# Patient Record
Sex: MALE | Race: WHITE | NOT HISPANIC OR LATINO | Employment: OTHER | URBAN - NONMETROPOLITAN AREA
[De-identification: names, ages, dates, MRNs, and addresses within clinical notes are randomized per-mention and may not be internally consistent; named-entity substitution may affect disease eponyms.]

---

## 2020-10-15 ENCOUNTER — CONSULT (OUTPATIENT)
Dept: CARDIOLOGY | Facility: CLINIC | Age: 75
End: 2020-10-15

## 2020-10-15 VITALS
HEIGHT: 70 IN | WEIGHT: 213 LBS | SYSTOLIC BLOOD PRESSURE: 120 MMHG | HEART RATE: 72 BPM | DIASTOLIC BLOOD PRESSURE: 74 MMHG | BODY MASS INDEX: 30.49 KG/M2 | OXYGEN SATURATION: 97 %

## 2020-10-15 DIAGNOSIS — Z86.79 S/P ASCENDING AORTIC ANEURYSM REPAIR: ICD-10-CM

## 2020-10-15 DIAGNOSIS — R07.89 CHEST PAIN, ATYPICAL: Primary | ICD-10-CM

## 2020-10-15 DIAGNOSIS — I10 ESSENTIAL HYPERTENSION: ICD-10-CM

## 2020-10-15 DIAGNOSIS — Z98.890 S/P ASCENDING AORTIC ANEURYSM REPAIR: ICD-10-CM

## 2020-10-15 DIAGNOSIS — E78.5 HYPERLIPIDEMIA, UNSPECIFIED HYPERLIPIDEMIA TYPE: ICD-10-CM

## 2020-10-15 PROCEDURE — 99204 OFFICE O/P NEW MOD 45 MIN: CPT | Performed by: INTERNAL MEDICINE

## 2020-10-15 PROCEDURE — 93000 ELECTROCARDIOGRAM COMPLETE: CPT | Performed by: INTERNAL MEDICINE

## 2020-10-15 RX ORDER — WARFARIN SODIUM 4 MG/1
TABLET ORAL
COMMUNITY

## 2020-10-15 RX ORDER — ATORVASTATIN CALCIUM 20 MG/1
TABLET, FILM COATED ORAL
COMMUNITY
End: 2020-11-24 | Stop reason: SDUPTHER

## 2020-10-15 RX ORDER — ASPIRIN 81 MG/1
TABLET ORAL
COMMUNITY

## 2020-10-15 RX ORDER — ATENOLOL 25 MG/1
TABLET ORAL
COMMUNITY

## 2020-10-15 RX ORDER — TRIAMCINOLONE ACETONIDE 1 MG/G
CREAM TOPICAL
COMMUNITY

## 2020-10-15 NOTE — PROGRESS NOTES
"     [facility] Cardiology OP Consult Note    Tad Yanes  [id number]  10/15/2020    Referred By: Marina Hsu DO    Chief Complaint: Chest pain    History of Present Illness:   Mr. Tad Yanes is a 75 y.o. male who presents to the Cardiology Clinic for evaluation of chest pain.  The patient has a past medical history significant for an ascending aortic aneurysm, status post repair in [place] in approximately .  He reports having a coronary angiogram at that time, which was reportedly unremarkable.  His medical history also includes a history of prior DVT for which he is maintained on Coumadin, hyperlipidemia, hypertension, and prediabetes.  He presents to the cardiology clinic today for evaluation of chest pain.  The patient reports up to a 1 year history of chest discomfort.  He describes a substernal \"pressure\" which occurs when walking uphill or at higher levels of exertion.  He reports the chest discomfort resolves quickly with rest.  He denies any symptoms while at rest.  No associated diaphoresis, nausea, or vomiting.  No radiation of the pain.  He reports that the pain has not progressed in frequency or intensity over the past several months.  He denies any orthopnea, PND, or lower extremity swelling.  No palpitations.  No other specific complaints at this time.    Past Cardiac Testin. Last Coronary Angio: , reportedly unremarkable  2. Prior Stress Testing: None  3. Last Echo: Unknown  4. Prior Holter Monitor: None    Review of Systems:   Review of Systems   Constitutional: Negative for activity change, appetite change, chills, diaphoresis, fatigue, fever, unexpected weight gain and unexpected weight loss.   Eyes: Negative for blurred vision and double vision.   Respiratory: Positive for chest tightness. Negative for cough, shortness of breath and wheezing.    Cardiovascular: Positive for chest pain. Negative for palpitations and leg swelling.   Gastrointestinal: " Negative for abdominal pain, anal bleeding, blood in stool and GERD.   Endocrine: Negative for cold intolerance and heat intolerance.   Genitourinary: Negative for hematuria.   Neurological: Negative for dizziness, syncope, weakness and light-headedness.   Hematological: Does not bruise/bleed easily.   Psychiatric/Behavioral: Negative for depressed mood and stress. The patient is not nervous/anxious.        Past Medical History:   Past Medical History:   Diagnosis Date   • Heart murmur    • History of blood clots        Past Surgical History:   Past Surgical History:   Procedure Laterality Date   • ASCENDING AORTIC ANEURYSM REPAIR  2010       Family History:   Family History   Problem Relation Age of Onset   • Arthritis Mother    • Obesity Father    • Heart attack Brother    • Obesity Brother    • Diabetes Brother    • Diabetes Paternal Grandmother        Social History:   Social History     Socioeconomic History   • Marital status:      Spouse name: Not on file   • Number of children: Not on file   • Years of education: Not on file   • Highest education level: Not on file   Tobacco Use   • Smoking status: Never Smoker   • Smokeless tobacco: Never Used   Substance and Sexual Activity   • Alcohol use: Never     Frequency: Never   • Drug use: Never   • Sexual activity: Defer       Medications:     Current Outpatient Medications:   •  aspirin (aspirin) 81 MG EC tablet, aspirin 81 mg tablet,delayed release  Take 1 tablet every day by oral route., Disp: , Rfl:   •  atenolol (TENORMIN) 25 MG tablet, atenolol 25 mg tablet  Take 0.5 tablets every day by oral route., Disp: , Rfl:   •  atorvastatin (LIPITOR) 20 MG tablet, atorvastatin 20 mg tablet  TAKE 1 TABLET BY MOUTH ONE TIME A DAY, Disp: , Rfl:   •  Coenzyme Q10 (COQ-10 PO), CoQ-10, Disp: , Rfl:   •  Misc Natural Products (PROSTATE HEALTH PO), Prostate Health  one tablet daily, Disp: , Rfl:   •  Multiple Vitamins-Minerals (EYE VITAMINS & MINERALS PO), Eye Vitamin  "and Minerals  as directed, Disp: , Rfl:   •  triamcinolone (KENALOG) 0.1 % cream, triamcinolone acetonide 0.1 % topical cream  APPLY A THIN LAYER TO THE AFFECTED AREA(S) BY TOPICAL ROUTE 2 TIMES PER DAY, Disp: , Rfl:   •  warfarin (Jantoven) 4 MG tablet, Jantoven 4 mg tablet  TAKE 1 TABLET BY MOUTH ONE TIME A DAY, Disp: , Rfl:     Allergies:   No Known Allergies    Physical Exam:  Vital Signs:   Vitals:    10/15/20 1445 10/15/20 1450   BP: 128/80 120/74   BP Location: Left arm Right arm   Patient Position: Sitting Sitting   Cuff Size: Adult Adult   Pulse: 72    SpO2: 97%    Weight: 96.6 kg (213 lb)    Height: 177.8 cm (70\")        Physical Exam  Constitutional:       General: He is not in acute distress.     Appearance: Normal appearance. He is well-developed. He is not diaphoretic.   HENT:      Head: Normocephalic and atraumatic.   Eyes:      General: No scleral icterus.     Pupils: Pupils are equal, round, and reactive to light.   Neck:      Musculoskeletal: Neck supple. No muscular tenderness.      Trachea: No tracheal deviation.   Cardiovascular:      Rate and Rhythm: Normal rate and regular rhythm.      Heart sounds: Murmur present. No friction rub. No gallop.       Comments: Normal JVD.  Pulmonary:      Effort: Pulmonary effort is normal. No respiratory distress.      Breath sounds: Normal breath sounds. No stridor. No wheezing or rales.   Chest:      Chest wall: No tenderness.   Abdominal:      General: Bowel sounds are normal. There is no distension.      Palpations: Abdomen is soft.      Tenderness: There is no abdominal tenderness. There is no guarding or rebound.   Musculoskeletal: Normal range of motion.   Lymphadenopathy:      Cervical: No cervical adenopathy.   Skin:     General: Skin is warm and dry.      Findings: No erythema.      Comments: Well-healed sternotomy scar   Neurological:      General: No focal deficit present.      Mental Status: He is alert and oriented to person, place, and time.   "   Psychiatric:         Mood and Affect: Mood normal.         Behavior: Behavior normal.         Results Review:   I reviewed the patient's new clinical results.  I personally viewed and interpreted the patient's EKG/Telemetry data      ECG 12 Lead    Date/Time: 10/15/2020 3:43 PM  Performed by: Devon Agustin MD  Authorized by: Devon Agustin MD   Comparison: not compared with previous ECG   Rhythm: sinus rhythm  Conduction: 1st degree AV block  Other findings: non-specific ST-T wave changes    Clinical impression: abnormal EKG            Assessment / Plan:     1. Chest pain  --No significant past cardiac history  --Current chest pain is consistent with chronic stable angina  --Last heart catheterization in 2010 reportedly without significant CAD, records pending  --ECG today without evidence of acute or prior ischemia  --Will obtain GXT to further evaluate for ischemia  --Continue beta-blocker as antianginal  --Follow-up in 1 month, or sooner pending results of GXT    2. Essential hypertension  --Controlled with current antihypertensives    3. Hyperlipidemia  --Continue statin    4. S/P ascending aortic aneurysm repair  --Will obtain operative report      Follow Up:   Return in about 4 weeks (around 11/12/2020).      Thank you for allowing me to participate in the care of your patient. Please to not hesitate to contact me with additional questions or concerns.     ROSE MARIE Agustin MD  Interventional Cardiology   10/15/2020  15:16 EDT

## 2020-11-24 ENCOUNTER — OFFICE VISIT (OUTPATIENT)
Dept: CARDIOLOGY | Facility: CLINIC | Age: 75
End: 2020-11-24

## 2020-11-24 VITALS
SYSTOLIC BLOOD PRESSURE: 134 MMHG | OXYGEN SATURATION: 96 % | HEART RATE: 69 BPM | HEIGHT: 70 IN | RESPIRATION RATE: 18 BRPM | DIASTOLIC BLOOD PRESSURE: 76 MMHG | WEIGHT: 271 LBS | BODY MASS INDEX: 38.8 KG/M2

## 2020-11-24 DIAGNOSIS — I10 ESSENTIAL HYPERTENSION: ICD-10-CM

## 2020-11-24 DIAGNOSIS — I25.118 CORONARY ARTERY DISEASE OF NATIVE ARTERY OF NATIVE HEART WITH STABLE ANGINA PECTORIS (HCC): Primary | ICD-10-CM

## 2020-11-24 DIAGNOSIS — E78.5 HYPERLIPIDEMIA, UNSPECIFIED HYPERLIPIDEMIA TYPE: ICD-10-CM

## 2020-11-24 PROCEDURE — 99213 OFFICE O/P EST LOW 20 MIN: CPT | Performed by: INTERNAL MEDICINE

## 2020-11-24 RX ORDER — NITROGLYCERIN 0.4 MG/1
TABLET SUBLINGUAL
Qty: 30 TABLET | Refills: 1 | Status: SHIPPED | OUTPATIENT
Start: 2020-11-24

## 2020-11-24 RX ORDER — ATORVASTATIN CALCIUM 40 MG/1
40 TABLET, FILM COATED ORAL DAILY
Qty: 90 TABLET | Refills: 3 | Status: SHIPPED | OUTPATIENT
Start: 2020-11-24 | End: 2021-08-24 | Stop reason: SDUPTHER

## 2020-11-25 NOTE — PROGRESS NOTES
[facility] Cardiology Office Follow Up Note    Tad Yanes  [id number]  2020    Primary Care Provider: Marina Hsu DO    Chief Complaint: Follow-up after cardiac testing    History of Present Illness:   Mr. Tad Yanes is a 75 y.o. male who presents to the Cardiology Clinic for follow-up after cardiac testing.  The patient has a past medical history significant for an ascending aortic aneurysm, status post repair in [place] in approximately . He had a coronary angiogram at that time which showed no significant coronary artery disease in a left dominant system.  He initially presented to the cardiology clinic for evaluation of chest pain consistent with stable angina.  He subsequent had an echocardiogram which showed normal LV systolic function and aortic sclerosis without significant stenosis.  He also had a GXT which was abnormal with development of 1 mm ST depression in inferior leads 4 minutes into stress and resolving within 1 minute of rest.  He did not have any associated exertional anginal symptoms.  He presents today for follow-up.  Today, the patient reports improvement in his episodes of chest pain.  He has not had any significant episodes of chest pain since undergoing stress testing.  He reports he remains active, working around his property and develops mild chest discomfort only with higher levels of exertion.  His chest comfort continues to resolve quickly with rest.  No symptoms while at rest.  No other specific complaints at this time.     Past Cardiac Testin. Last Coronary Angio:  2010   1.  No significant coronary artery disease   2.  Left dominant system  2. Prior Stress Testing:  GXT 2020   1.  Abnormal exercise treadmill stress test with 1 mm ST depression in the inferior leads beginning 4 minutes into stress, resolving 1 minute into rest.   2.  No anginal symptoms during peak stress.   3.  Normal chronotropic and BP response to  exercise.   4.  Average exercise capacity reaching 7.0 METS.   5.  Moderate risk Trevino treadmill score.  3. Last Echo:  11/9/2020   1.  Normal left ventricular size and systolic function, LVEF 60-65%.   2.  Mild concentric LVH.   3.  Normal LV diastolic filling pattern.   4.  Normal right ventricular size and systolic function.   5.  Moderate calcification of the aortic valve with aortic sclerosis (mean gradient 15 mmHg, max velocity 238 cm/s)   6.  Trace MR and TR.   7.  Mild dilation of the proximal ascending aorta measuring 3.9 cm.  4. Prior Holter Monitor: None       Review of Systems:   Review of Systems   Constitutional: Negative for activity change, appetite change, chills, diaphoresis, fatigue, fever, unexpected weight gain and unexpected weight loss.   Eyes: Negative for blurred vision and double vision.   Respiratory: Positive for chest tightness. Negative for cough, shortness of breath and wheezing.    Cardiovascular: Negative for chest pain, palpitations and leg swelling.   Gastrointestinal: Negative for abdominal pain, anal bleeding, blood in stool and GERD.   Endocrine: Negative for cold intolerance and heat intolerance.   Genitourinary: Negative for hematuria.   Neurological: Negative for dizziness, syncope, weakness and light-headedness.   Hematological: Does not bruise/bleed easily.   Psychiatric/Behavioral: Negative for depressed mood and stress. The patient is not nervous/anxious.        I have reviewed and/or updated the patient's past medical, past surgical, family, social history, problem list and allergies as appropriate.     Medications:     Current Outpatient Medications:   •  aspirin (aspirin) 81 MG EC tablet, aspirin 81 mg tablet,delayed release  Take 1 tablet every day by oral route., Disp: , Rfl:   •  atenolol (TENORMIN) 25 MG tablet, atenolol 25 mg tablet  Take 0.5 tablets every day by oral route., Disp: , Rfl:   •  atorvastatin (LIPITOR) 40 MG tablet, Take 1 tablet by mouth Daily.,  "Disp: 90 tablet, Rfl: 3  •  Coenzyme Q10 (COQ-10 PO), CoQ-10, Disp: , Rfl:   •  Misc Natural Products (PROSTATE HEALTH PO), Prostate Health  one tablet daily, Disp: , Rfl:   •  Multiple Vitamins-Minerals (EYE VITAMINS & MINERALS PO), Eye Vitamin and Minerals  as directed, Disp: , Rfl:   •  triamcinolone (KENALOG) 0.1 % cream, triamcinolone acetonide 0.1 % topical cream  APPLY A THIN LAYER TO THE AFFECTED AREA(S) BY TOPICAL ROUTE 2 TIMES PER DAY, Disp: , Rfl:   •  warfarin (Jantoven) 4 MG tablet, Jantoven 4 mg tablet  TAKE 1 TABLET BY MOUTH ONE TIME A DAY, Disp: , Rfl:   •  nitroglycerin (NITROSTAT) 0.4 MG SL tablet, 1 under the tongue as needed for angina, may repeat q5mins for up three doses, Disp: 30 tablet, Rfl: 1    Physical Exam:  Vital Signs:   Vitals:    11/24/20 1544   BP: 134/76   BP Location: Right arm   Patient Position: Sitting   Cuff Size: Adult   Pulse: 69   Resp: 18   SpO2: 96%   Weight: 123 kg (271 lb)   Height: 177.8 cm (70\")       Physical Exam  Constitutional:       General: He is not in acute distress.     Appearance: Normal appearance. He is well-developed. He is not diaphoretic.   HENT:      Head: Normocephalic and atraumatic.   Eyes:      General: No scleral icterus.     Pupils: Pupils are equal, round, and reactive to light.   Neck:      Musculoskeletal: Neck supple. No muscular tenderness.      Trachea: No tracheal deviation.   Cardiovascular:      Rate and Rhythm: Normal rate and regular rhythm.      Heart sounds: Normal heart sounds. No murmur. No friction rub. No gallop.       Comments: Normal JVD.  Pulmonary:      Effort: Pulmonary effort is normal. No respiratory distress.      Breath sounds: Normal breath sounds. No stridor. No wheezing or rales.   Chest:      Chest wall: No tenderness.   Abdominal:      General: Bowel sounds are normal. There is no distension.      Palpations: Abdomen is soft.      Tenderness: There is no abdominal tenderness. There is no guarding or rebound. "   Musculoskeletal: Normal range of motion.         General: No swelling.   Lymphadenopathy:      Cervical: No cervical adenopathy.   Skin:     General: Skin is warm and dry.      Findings: No erythema.   Neurological:      General: No focal deficit present.      Mental Status: He is alert and oriented to person, place, and time.   Psychiatric:         Mood and Affect: Mood normal.         Behavior: Behavior normal.         Results Review:   I reviewed the patient's new clinical results.        Assessment / Plan:     1. Chest pain  --Initially presented for evaluation of chest pain consistent with chronic stable angina  --GXT in 11/20 found to be abnormal with 1 mm depression in the inferior leads 4 minutes in distress, resolving within 1 minute of rest, no angina   --Interval improvement in chest pain since undergoing GXT, mild stable exertional angina  --Discussed optimization of medical management versus invasive ischemic evaluation, and the patient would like to pursue medical management at this time  --Continue atenolol as antianginal  --Nitroglycerin as needed  --Will increase statin to high intensity dosing  --Continue aspirin 81 mg daily  --If recurrent chest pain despite antianginals, would then pursue invasive ischemic evaluation  --Follow-up in 3 months, or sooner if required     2. Essential hypertension  --Controlled with current antihypertensives     3. Hyperlipidemia  --Increase statin to high intensity dosing     4.  Ascending aortic aneurysm repair  --S/p operative repair in 2010        Follow Up:   Return in about 3 months (around 2/24/2021).      Thank you for allowing me to participate in the care of your patient. Please to not hesitate to contact me with additional questions or concerns.     ROSE MARIE Agustin MD  Interventional Cardiology   11/24/2020  09:45 EST

## 2021-02-23 ENCOUNTER — OFFICE VISIT (OUTPATIENT)
Dept: CARDIOLOGY | Facility: CLINIC | Age: 76
End: 2021-02-23

## 2021-02-23 VITALS
WEIGHT: 226 LBS | OXYGEN SATURATION: 98 % | RESPIRATION RATE: 18 BRPM | SYSTOLIC BLOOD PRESSURE: 138 MMHG | DIASTOLIC BLOOD PRESSURE: 86 MMHG | HEART RATE: 90 BPM | BODY MASS INDEX: 32.35 KG/M2 | HEIGHT: 70 IN | TEMPERATURE: 97.8 F

## 2021-02-23 DIAGNOSIS — E78.49 OTHER HYPERLIPIDEMIA: ICD-10-CM

## 2021-02-23 DIAGNOSIS — I25.10 CAD IN NATIVE ARTERY: Primary | ICD-10-CM

## 2021-02-23 DIAGNOSIS — I10 ESSENTIAL HYPERTENSION: ICD-10-CM

## 2021-02-23 PROCEDURE — 99213 OFFICE O/P EST LOW 20 MIN: CPT | Performed by: INTERNAL MEDICINE

## 2021-02-23 NOTE — PROGRESS NOTES
[facility] Cardiology Office Follow Up Note    Tad Yanes  [id number]  2021    Primary Care Provider: Marina Hsu DO    Chief Complaint: Regular follow-up    History of Present Illness:   Mr. Tad Yanes is a 75 y.o. male who presents to the Cardiology Clinic for regular follow-up.  The patient has a past medical history significant for an ascending aortic aneurysm, status post repair in [place] in approximately . He had a coronary angiogram at that time which showed no significant coronary artery disease in a left dominant system.  He initially presented to the cardiology clinic for evaluation of chest pain consistent with stable angina.  He subsequent had an echocardiogram which showed normal LV systolic function and aortic sclerosis without significant stenosis.  He also had a GXT which was abnormal with development of 1 mm ST depression in inferior leads 4 minutes into stress and resolving within 1 minute of rest.  At that time, the patient opted for optimizing medical management.  He returns to cardiology clinic today for regular follow-up.  Since his last appointment, the patient reports rare episodes of chest pain.  At this time, he reports less than 1 episode of chest pain per week.  Most typically, the discomfort occurs at higher levels of exertion.  No significant episodes while at rest.  He has not required any sublingual nitroglycerin.  No significant exertional dyspnea, orthopnea, or PND.  No other specific complaints at this time.    Past Cardiac Testin. Last Coronary Angio:  2010              1.  No significant coronary artery disease              2.  Left dominant system  2. Prior Stress Testing:  GXT 2020              1.  Abnormal exercise treadmill stress test with 1 mm ST depression in the inferior leads beginning 4 minutes into stress, resolving 1 minute into rest.              2.  No anginal symptoms during peak stress.               3.  Normal chronotropic and BP response to exercise.              4.  Average exercise capacity reaching 7.0 METS.              5.  Moderate risk Trevino treadmill score.  3. Last Echo:  11/9/2020              1.  Normal left ventricular size and systolic function, LVEF 60-65%.              2.  Mild concentric LVH.              3.  Normal LV diastolic filling pattern.              4.  Normal right ventricular size and systolic function.              5.  Moderate calcification of the aortic valve with aortic sclerosis (mean gradient 15 mmHg, max velocity 238 cm/s)              6.  Trace MR and TR.              7.  Mild dilation of the proximal ascending aorta measuring 3.9 cm.  4. Prior Holter Monitor: None    Review of Systems:   Review of Systems   Constitutional: Negative for activity change, appetite change, chills, diaphoresis, fatigue, fever, unexpected weight gain and unexpected weight loss.   Eyes: Negative for blurred vision and double vision.   Respiratory: Positive for chest tightness. Negative for cough, shortness of breath and wheezing.    Cardiovascular: Negative for chest pain, palpitations and leg swelling.   Gastrointestinal: Negative for abdominal pain, anal bleeding, blood in stool and GERD.   Endocrine: Negative for cold intolerance and heat intolerance.   Genitourinary: Negative for hematuria.   Neurological: Negative for dizziness, syncope, weakness and light-headedness.   Hematological: Does not bruise/bleed easily.   Psychiatric/Behavioral: Negative for depressed mood and stress. The patient is not nervous/anxious.        I have reviewed and/or updated the patient's past medical, past surgical, family, social history, problem list and allergies as appropriate.     Medications:     Current Outpatient Medications:   •  aspirin (aspirin) 81 MG EC tablet, aspirin 81 mg tablet,delayed release  Take 1 tablet every day by oral route., Disp: , Rfl:   •  atenolol (TENORMIN) 25 MG tablet, atenolol 25  "mg tablet  Take 0.5 tablets every day by oral route., Disp: , Rfl:   •  atorvastatin (LIPITOR) 40 MG tablet, Take 1 tablet by mouth Daily., Disp: 90 tablet, Rfl: 3  •  Coenzyme Q10 (COQ-10 PO), CoQ-10, Disp: , Rfl:   •  Misc Natural Products (PROSTATE HEALTH PO), Prostate Health  one tablet daily, Disp: , Rfl:   •  Multiple Vitamins-Minerals (EYE VITAMINS & MINERALS PO), Eye Vitamin and Minerals  as directed, Disp: , Rfl:   •  nitroglycerin (NITROSTAT) 0.4 MG SL tablet, 1 under the tongue as needed for angina, may repeat q5mins for up three doses, Disp: 30 tablet, Rfl: 1  •  triamcinolone (KENALOG) 0.1 % cream, triamcinolone acetonide 0.1 % topical cream  APPLY A THIN LAYER TO THE AFFECTED AREA(S) BY TOPICAL ROUTE 2 TIMES PER DAY, Disp: , Rfl:   •  warfarin (Jantoven) 4 MG tablet, Jantoven 4 mg tablet  TAKE 1 TABLET BY MOUTH ONE TIME A DAY, Disp: , Rfl:     Physical Exam:  Vital Signs:   Vitals:    02/23/21 0951   BP: 138/86   Pulse: 90   Resp: 18   Temp: 97.8 °F (36.6 °C)   SpO2: 98%   Weight: 103 kg (226 lb)   Height: 177.8 cm (70\")       Physical Exam  Constitutional:       General: He is not in acute distress.     Appearance: Normal appearance. He is well-developed. He is not diaphoretic.   HENT:      Head: Normocephalic and atraumatic.   Eyes:      General: No scleral icterus.     Pupils: Pupils are equal, round, and reactive to light.   Neck:      Musculoskeletal: Neck supple. No muscular tenderness.      Trachea: No tracheal deviation.   Cardiovascular:      Rate and Rhythm: Normal rate and regular rhythm.      Heart sounds: No friction rub. No gallop.       Comments: Normal JVD.  2/6 systolic ejection murmur greatest over the aortic area  Pulmonary:      Effort: Pulmonary effort is normal. No respiratory distress.      Breath sounds: Normal breath sounds. No stridor. No wheezing or rales.   Chest:      Chest wall: No tenderness.   Abdominal:      General: Bowel sounds are normal. There is no distension.      " Palpations: Abdomen is soft.      Tenderness: There is no abdominal tenderness. There is no guarding or rebound.   Musculoskeletal: Normal range of motion.         General: No swelling.   Lymphadenopathy:      Cervical: No cervical adenopathy.   Skin:     General: Skin is warm and dry.      Findings: No erythema.   Neurological:      General: No focal deficit present.      Mental Status: He is alert and oriented to person, place, and time.   Psychiatric:         Mood and Affect: Mood normal.         Behavior: Behavior normal.         Results Review:   I reviewed the patient's new clinical results.        Assessment / Plan:     1. Chest pain  --GXT in 11/20 found to be abnormal with 1 mm depression in the inferior leads 4 minutes into stress, resolving within 1 minute of rest, no angina   --Previously discussed optimizing medical management versus invasive ischemic evaluation, the patient is opted for medical management  --Chest pain currently well controlled with atenolol, no progression in symptoms since last appointment  --Continue sublingual nitroglycerin as needed  --Continue aspirin and high intensity statin  --If escalation in chest pain, would then consider the addition of Imdur and invasive ischemic evaluation if chest pain is unable to be controlled  --Follow-up in 6 months, or sooner if required     2. Essential hypertension  --Remains controlled with current antihypertensives     3. Hyperlipidemia  --Continue high intensity statin     4.  Ascending aortic aneurysm repair  --S/p operative repair in 2010        Follow Up:   Return in about 6 months (around 8/23/2021).      Thank you for allowing me to participate in the care of your patient. Please to not hesitate to contact me with additional questions or concerns.     ROSE MARIE Agustin MD  Interventional Cardiology   02/23/2021  09:55 EST

## 2021-08-24 ENCOUNTER — OFFICE VISIT (OUTPATIENT)
Dept: CARDIOLOGY | Facility: CLINIC | Age: 76
End: 2021-08-24

## 2021-08-24 VITALS
SYSTOLIC BLOOD PRESSURE: 122 MMHG | BODY MASS INDEX: 30.35 KG/M2 | HEIGHT: 70 IN | HEART RATE: 68 BPM | OXYGEN SATURATION: 98 % | DIASTOLIC BLOOD PRESSURE: 86 MMHG | WEIGHT: 212 LBS | RESPIRATION RATE: 16 BRPM

## 2021-08-24 DIAGNOSIS — E78.5 HYPERLIPIDEMIA, UNSPECIFIED HYPERLIPIDEMIA TYPE: ICD-10-CM

## 2021-08-24 DIAGNOSIS — I25.118 CORONARY ARTERY DISEASE OF NATIVE ARTERY OF NATIVE HEART WITH STABLE ANGINA PECTORIS (HCC): Primary | ICD-10-CM

## 2021-08-24 DIAGNOSIS — I10 ESSENTIAL HYPERTENSION: ICD-10-CM

## 2021-08-24 PROCEDURE — 99214 OFFICE O/P EST MOD 30 MIN: CPT | Performed by: INTERNAL MEDICINE

## 2021-08-24 RX ORDER — ATORVASTATIN CALCIUM 40 MG/1
40 TABLET, FILM COATED ORAL DAILY
Qty: 90 TABLET | Refills: 3 | Status: SHIPPED | OUTPATIENT
Start: 2021-08-24

## 2021-08-24 NOTE — PROGRESS NOTES
[facility] Cardiology Office Follow Up Note    Tad Yanes  [id number]  2021    Primary Care Provider: Marina Hsu DO    Chief Complaint: Regular follow-up    History of Present Illness:   Mr. Tad Yanes is a 76 y.o. male who presents to the Cardiology Clinic for regular follow-up.  The patient has a past medical history significant for an ascending aortic aneurysm, status post repair in [place] in approximately . He had a coronary angiogram at that time which showed no significant coronary artery disease in a left dominant system.  He initially presented to the cardiology clinic for evaluation of chest pain consistent with stable angina.  He subsequent had an echocardiogram which showed normal LV systolic function and aortic sclerosis without significant stenosis.  He also had a GXT which was abnormal with development of 1 mm ST depression in inferior leads 4 minutes into stress and resolving within 1 minute of rest.  At that time, the patient opted for optimizing medical management.  He presents to cardiology clinic today for routine follow-up.  Since his last appointment, the patient reports he has been well from a cardiac standpoint.  He reports rare episodes of a substernal chest discomfort, occurring approximately 1 time per month.  The episodes will last several minutes, before resolving spontaneously.  He denies any clear association with exertion.  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  He continues to tolerate his current cardiac medications without difficulty.  No other specific complaints at this time.    Past Cardiac Testin. Last Coronary Angio:  2010              1.  No significant coronary artery disease              2.  Left dominant system  2. Prior Stress Testing:  GXT 2020              1.  Abnormal exercise treadmill stress test with 1 mm ST depression in the inferior leads beginning 4  minutes into stress, resolving 1 minute into rest.              2.  No anginal symptoms during peak stress.              3.  Normal chronotropic and BP response to exercise.              4.  Average exercise capacity reaching 7.0 METS.              5.  Moderate risk Trevino treadmill score.  3. Last Echo:  11/9/2020              1.  Normal left ventricular size and systolic function, LVEF 60-65%.              2.  Mild concentric LVH.              3.  Normal LV diastolic filling pattern.              4.  Normal right ventricular size and systolic function.              5.  Moderate calcification of the aortic valve with aortic sclerosis (mean gradient 15 mmHg, max velocity 238 cm/s)              6.  Trace MR and TR.              7.  Mild dilation of the proximal ascending aorta measuring 3.9 cm.  4. Prior Holter Monitor: None    Review of Systems:   Review of Systems   Constitutional: Negative for activity change, appetite change, chills, diaphoresis, fatigue, fever, unexpected weight gain and unexpected weight loss.   Eyes: Negative for blurred vision and double vision.   Respiratory: Positive for chest tightness. Negative for cough, shortness of breath and wheezing.    Cardiovascular: Negative for chest pain, palpitations and leg swelling.   Gastrointestinal: Negative for abdominal pain, anal bleeding, blood in stool and GERD.   Endocrine: Negative for cold intolerance and heat intolerance.   Genitourinary: Negative for hematuria.   Neurological: Negative for dizziness, syncope, weakness and light-headedness.   Hematological: Does not bruise/bleed easily.   Psychiatric/Behavioral: Negative for depressed mood and stress. The patient is not nervous/anxious.        I have reviewed and/or updated the patient's past medical, past surgical, family, social history, problem list and allergies as appropriate.     Medications:     Current Outpatient Medications:   •  aspirin (aspirin) 81 MG EC tablet, aspirin 81 mg  "tablet,delayed release  Take 1 tablet every day by oral route., Disp: , Rfl:   •  atenolol (TENORMIN) 25 MG tablet, atenolol 25 mg tablet  Take 0.5 tablets every day by oral route., Disp: , Rfl:   •  atorvastatin (LIPITOR) 40 MG tablet, Take 1 tablet by mouth Daily., Disp: 90 tablet, Rfl: 3  •  Coenzyme Q10 (COQ-10 PO), CoQ-10, Disp: , Rfl:   •  Misc Natural Products (PROSTATE HEALTH PO), Prostate Health  one tablet daily, Disp: , Rfl:   •  Multiple Vitamins-Minerals (EYE VITAMINS & MINERALS PO), Eye Vitamin and Minerals  as directed, Disp: , Rfl:   •  nitroglycerin (NITROSTAT) 0.4 MG SL tablet, 1 under the tongue as needed for angina, may repeat q5mins for up three doses, Disp: 30 tablet, Rfl: 1  •  triamcinolone (KENALOG) 0.1 % cream, triamcinolone acetonide 0.1 % topical cream  APPLY A THIN LAYER TO THE AFFECTED AREA(S) BY TOPICAL ROUTE 2 TIMES PER DAY, Disp: , Rfl:   •  warfarin (Jantoven) 4 MG tablet, Jantoven 4 mg tablet  TAKE 1 TABLET BY MOUTH ONE TIME A DAY, Disp: , Rfl:     Physical Exam:  Vital Signs:   Vitals:    08/24/21 1009   BP: 122/86   BP Location: Right arm   Patient Position: Sitting   Pulse: 68   Resp: 16   SpO2: 98%   Weight: 96.2 kg (212 lb)   Height: 177.8 cm (70\")       Physical Exam  Constitutional:       General: He is not in acute distress.     Appearance: Normal appearance. He is well-developed. He is not diaphoretic.   HENT:      Head: Normocephalic and atraumatic.   Eyes:      General: No scleral icterus.     Pupils: Pupils are equal, round, and reactive to light.   Neck:      Trachea: No tracheal deviation.   Cardiovascular:      Rate and Rhythm: Normal rate and regular rhythm.      Heart sounds: Normal heart sounds. No murmur heard.   No friction rub. No gallop.       Comments: Normal JVD.  Pulmonary:      Effort: Pulmonary effort is normal. No respiratory distress.      Breath sounds: Normal breath sounds. No stridor. No wheezing or rales.   Chest:      Chest wall: No tenderness. "   Abdominal:      General: Bowel sounds are normal. There is no distension.      Palpations: Abdomen is soft.      Tenderness: There is no abdominal tenderness. There is no guarding or rebound.   Musculoskeletal:         General: No swelling. Normal range of motion.      Cervical back: Neck supple.   Lymphadenopathy:      Cervical: No cervical adenopathy.   Skin:     General: Skin is warm and dry.      Findings: No erythema.   Neurological:      General: No focal deficit present.      Mental Status: He is alert and oriented to person, place, and time.   Psychiatric:         Mood and Affect: Mood normal.         Behavior: Behavior normal.         Results Review:   I reviewed the patient's new clinical results.      Assessment / Plan:     1.  Coronary artery disease  --GXT in 11/20 found to be abnormal with 1 mm depression in the inferior leads 4 minutes into stress, resolving within 1 minute of rest, no angina   --Currently doing well with rare episodes of chest discomfort, no exertional angina  --Given the patient's symptoms remain well controlled, will continue medical management with low threshold for invasive coronary angiogram if progression of symptoms  --Continue atenolol as antianginal  --Continue aspirin and high intensity statin  --Follow-up in 6 months, or sooner if required     2. Essential hypertension  --Controlled with current antihypertensives     3. Hyperlipidemia  --On statin     4.  Ascending aortic aneurysm repair  --S/p operative repair in 2010      Follow Up:   Return in about 6 months (around 2/24/2022).      Thank you for allowing me to participate in the care of your patient. Please to not hesitate to contact me with additional questions or concerns.     ROSE MARIE Agustin MD  Interventional Cardiology   08/24/2021  10:12 EDT

## 2022-03-04 ENCOUNTER — OFFICE VISIT (OUTPATIENT)
Dept: CARDIOLOGY | Facility: CLINIC | Age: 77
End: 2022-03-04

## 2022-03-04 VITALS
SYSTOLIC BLOOD PRESSURE: 130 MMHG | DIASTOLIC BLOOD PRESSURE: 80 MMHG | RESPIRATION RATE: 18 BRPM | HEART RATE: 97 BPM | WEIGHT: 214 LBS | BODY MASS INDEX: 30.64 KG/M2 | HEIGHT: 70 IN | OXYGEN SATURATION: 99 %

## 2022-03-04 DIAGNOSIS — I10 PRIMARY HYPERTENSION: ICD-10-CM

## 2022-03-04 DIAGNOSIS — I25.10 CORONARY ARTERY DISEASE INVOLVING NATIVE CORONARY ARTERY OF NATIVE HEART WITHOUT ANGINA PECTORIS: ICD-10-CM

## 2022-03-04 DIAGNOSIS — E78.00 PURE HYPERCHOLESTEROLEMIA: ICD-10-CM

## 2022-03-04 DIAGNOSIS — I35.8 NON-RHEUMATIC AORTIC SCLEROSIS: Primary | ICD-10-CM

## 2022-03-04 DIAGNOSIS — Z86.79 S/P ASCENDING AORTIC ANEURYSM REPAIR: ICD-10-CM

## 2022-03-04 DIAGNOSIS — Z98.890 S/P ASCENDING AORTIC ANEURYSM REPAIR: ICD-10-CM

## 2022-03-04 PROBLEM — I82.409 DEEP VENOUS THROMBOSIS OF LOWER EXTREMITY: Status: ACTIVE | Noted: 2019-04-12

## 2022-03-04 PROBLEM — G47.33 OBSTRUCTIVE SLEEP APNEA OF ADULT: Status: ACTIVE | Noted: 2021-09-20

## 2022-03-04 PROBLEM — H61.21 IMPACTED CERUMEN OF RIGHT EAR: Status: ACTIVE | Noted: 2019-10-18

## 2022-03-04 PROBLEM — Z12.11 ENCOUNTER FOR SCREENING FOR MALIGNANT NEOPLASM OF COLON: Status: ACTIVE | Noted: 2019-04-12

## 2022-03-04 PROBLEM — E78.5 HYPERLIPIDEMIA: Status: ACTIVE | Noted: 2019-04-12

## 2022-03-04 PROBLEM — L30.9 CHRONIC ECZEMA OF HAND: Status: ACTIVE | Noted: 2021-04-15

## 2022-03-04 PROBLEM — R73.03 PREDIABETES: Status: ACTIVE | Noted: 2019-10-18

## 2022-03-04 PROCEDURE — 99214 OFFICE O/P EST MOD 30 MIN: CPT | Performed by: INTERNAL MEDICINE

## 2022-03-04 RX ORDER — BETAMETHASONE DIPROPIONATE 0.5 MG/G
OINTMENT TOPICAL
COMMUNITY

## 2022-03-04 NOTE — PROGRESS NOTES
[facility] Cardiology Office Follow Up Note    Tad Yanes  [id number]  2022    Primary Care Provider: Marina Hsu DO    Chief Complaint: Routine follow-up    History of Present Illness:   Mr. Tad Yanes is a 76 y.o. male who presents to the Cardiology Clinic for routine follow-up.  The patient has a past medical history significant for hypertension, hyperlipidemia, and an ascending aortic aneurysm, status post repair in [place] in approximately . He had a coronary angiogram at that time which showed no significant coronary artery disease in a left dominant system.  He returns to cardiology clinic today for routine follow-up.  Since his last appointment, the patient reports he has been well with no significant changes in his health.  He remains active, without exertional chest pain or anginal symptoms.  No significant exertional dyspnea.  He continues to tolerate Coumadin without significant bleeding or bruising.  No significant palpitations.  No specific complaints today.    Past Cardiac Testin. Last Coronary Angio:  2010              1.  No significant coronary artery disease              2.  Left dominant system  2. Prior Stress Testing:  GXT 2020              1.  Abnormal exercise treadmill stress test with 1 mm ST depression in the inferior leads beginning 4 minutes into stress, resolving 1 minute into rest.              2.  No anginal symptoms during peak stress.              3.  Normal chronotropic and BP response to exercise.              4.  Average exercise capacity reaching 7.0 METS.              5.  Moderate risk Trevino treadmill score.  3. Last Echo:  2020              1.  Normal left ventricular size and systolic function, LVEF 60-65%.              2.  Mild concentric LVH.              3.  Normal LV diastolic filling pattern.              4.  Normal right ventricular size and systolic function.              5.  Moderate  calcification of the aortic valve with aortic sclerosis (mean gradient 15 mmHg, max velocity 238 cm/s)              6.  Trace MR and TR.              7.  Mild dilation of the proximal ascending aorta measuring 3.9 cm.  4. Prior Holter Monitor: None    Review of Systems:   Review of Systems   Constitutional: Negative for activity change, appetite change, chills, diaphoresis, fatigue, fever, unexpected weight gain and unexpected weight loss.   Eyes: Negative for blurred vision and double vision.   Respiratory: Negative for cough, chest tightness, shortness of breath and wheezing.    Cardiovascular: Negative for chest pain, palpitations and leg swelling.   Gastrointestinal: Negative for abdominal pain, anal bleeding, blood in stool and GERD.   Endocrine: Negative for cold intolerance and heat intolerance.   Genitourinary: Negative for hematuria.   Neurological: Negative for dizziness, syncope, weakness and light-headedness.   Hematological: Does not bruise/bleed easily.   Psychiatric/Behavioral: Negative for depressed mood and stress. The patient is not nervous/anxious.        I have reviewed and/or updated the patient's past medical, past surgical, family, social history, problem list and allergies as appropriate.     Medications:     Current Outpatient Medications:   •  aspirin (aspirin) 81 MG EC tablet, aspirin 81 mg tablet,delayed release  Take 1 tablet every day by oral route., Disp: , Rfl:   •  atenolol (TENORMIN) 25 MG tablet, atenolol 25 mg tablet  Take 0.5 tablets every day by oral route., Disp: , Rfl:   •  atorvastatin (LIPITOR) 40 MG tablet, Take 1 tablet by mouth Daily., Disp: 90 tablet, Rfl: 3  •  betamethasone, augmented, (DIPROLENE) 0.05 % ointment, betamethasone, augmented 0.05 % topical ointment  APPLY A THIN LAYER TOPICALLY TO AFFECTED AREAS ON HANDS NIGHTLY AND COVER WITH COTTON GLOVES AND LEAVE ON OVER NIGHT, Disp: , Rfl:   •  Coenzyme Q10 (COQ-10 PO), CoQ-10, Disp: , Rfl:   •  Misc Natural Products  "(PROSTATE HEALTH PO), Prostate Health  one tablet daily, Disp: , Rfl:   •  Multiple Vitamins-Minerals (EYE VITAMINS & MINERALS PO), Eye Vitamin and Minerals  as directed, Disp: , Rfl:   •  nitroglycerin (NITROSTAT) 0.4 MG SL tablet, 1 under the tongue as needed for angina, may repeat q5mins for up three doses, Disp: 30 tablet, Rfl: 1  •  triamcinolone (KENALOG) 0.1 % cream, triamcinolone acetonide 0.1 % topical cream  APPLY A THIN LAYER TO THE AFFECTED AREA(S) BY TOPICAL ROUTE 2 TIMES PER DAY, Disp: , Rfl:   •  warfarin (Jantoven) 4 MG tablet, Jantoven 4 mg tablet  TAKE 1 TABLET BY MOUTH ONE TIME A DAY, Disp: , Rfl:     Physical Exam:  Vital Signs:   Vitals:    03/04/22 1008   BP: 130/80   BP Location: Right arm   Patient Position: Sitting   Pulse: 97   Resp: 18   SpO2: 99%   Weight: 97.1 kg (214 lb)   Height: 177.8 cm (70\")       Physical Exam  Constitutional:       General: He is not in acute distress.     Appearance: Normal appearance. He is well-developed. He is not diaphoretic.   HENT:      Head: Normocephalic and atraumatic.   Eyes:      General: No scleral icterus.     Pupils: Pupils are equal, round, and reactive to light.   Neck:      Trachea: No tracheal deviation.   Cardiovascular:      Rate and Rhythm: Normal rate and regular rhythm.      Heart sounds: Normal heart sounds. No friction rub. No gallop.       Comments: Normal JVD.  3/6 systolic murmur over the aortic area  Pulmonary:      Effort: Pulmonary effort is normal. No respiratory distress.      Breath sounds: Normal breath sounds. No stridor. No wheezing or rales.   Chest:      Chest wall: No tenderness.   Abdominal:      General: Bowel sounds are normal. There is no distension.      Palpations: Abdomen is soft.      Tenderness: There is no abdominal tenderness. There is no guarding or rebound.   Musculoskeletal:         General: Normal range of motion.      Cervical back: Neck supple. No tenderness.   Lymphadenopathy:      Cervical: No cervical " adenopathy.   Skin:     General: Skin is warm and dry.      Findings: No erythema.   Neurological:      General: No focal deficit present.      Mental Status: He is alert and oriented to person, place, and time.   Psychiatric:         Mood and Affect: Mood normal.         Behavior: Behavior normal.         Results Review:   I reviewed the patient's new clinical results.        Assessment / Plan:     1.  Coronary artery disease  --No significant past cardiac history  --Remote coronary angiogram in approximately 2010 without significant CAD  --Mildly abnormal GXT in 11/2020, however no anginal symptoms and further invasive ischemic evaluation deferred at that time  --Continues to remain active without exertional angina  --Given the possibility of underlying CAD, continue aspirin and high intensity statin  --Follow-up in 1 year, sooner if required     2.  Aortic sclerosis  --No significant associated stenosis on last echocardiogram in 2020  --Repeat echocardiogram for surveillance    3.  Essential hypertension  --Remains adequately controlled with atenolol     4.  Hyperlipidemia  --On statin  --No recent lipid profile for review, will obtain labs from PCP     5.  Ascending aortic aneurysm repair  --S/p operative repair in 2010    6.  History of DVT/PE  --History of recurrent lower extremity DVTs, complicated by PE  --Underlying etiology unclear, suspicion for underlying hypercoagulable state  --Continue Coumadin  --Discussed switching to DOAC, however the patient wishes to continue with Coumadin at this time      Follow Up:   Return in about 1 year (around 3/4/2023).      Thank you for allowing me to participate in the care of your patient. Please to not hesitate to contact me with additional questions or concerns.     ROSE MARIE Agustin MD  Interventional Cardiology   03/04/2022  10:12 EST

## 2022-06-20 ENCOUNTER — TRANSCRIBE ORDERS (OUTPATIENT)
Dept: ADMINISTRATIVE | Facility: HOSPITAL | Age: 77
End: 2022-06-20

## 2022-06-20 DIAGNOSIS — N50.89 OTHER SPECIFIED DISORDERS OF THE MALE GENITAL ORGANS: Primary | ICD-10-CM

## 2022-07-12 ENCOUNTER — HOSPITAL ENCOUNTER (OUTPATIENT)
Dept: ULTRASOUND IMAGING | Facility: HOSPITAL | Age: 77
Discharge: HOME | End: 2022-07-12
Admitting: FAMILY MEDICINE

## 2022-07-12 DIAGNOSIS — N50.89 OTHER SPECIFIED DISORDERS OF THE MALE GENITAL ORGANS: ICD-10-CM

## 2022-07-12 PROCEDURE — 76870 US EXAM SCROTUM: CPT

## 2023-03-01 ENCOUNTER — OFFICE VISIT (OUTPATIENT)
Dept: CARDIOLOGY | Facility: CLINIC | Age: 78
End: 2023-03-01
Payer: MEDICARE

## 2023-03-01 VITALS
RESPIRATION RATE: 16 BRPM | DIASTOLIC BLOOD PRESSURE: 80 MMHG | WEIGHT: 212 LBS | SYSTOLIC BLOOD PRESSURE: 130 MMHG | HEIGHT: 70 IN | HEART RATE: 70 BPM | BODY MASS INDEX: 30.35 KG/M2 | OXYGEN SATURATION: 95 %

## 2023-03-01 DIAGNOSIS — I35.8 NON-RHEUMATIC AORTIC SCLEROSIS: Primary | ICD-10-CM

## 2023-03-01 DIAGNOSIS — Z98.890 S/P ASCENDING AORTIC ANEURYSM REPAIR: ICD-10-CM

## 2023-03-01 DIAGNOSIS — I10 PRIMARY HYPERTENSION: ICD-10-CM

## 2023-03-01 DIAGNOSIS — I25.10 CORONARY ARTERY DISEASE INVOLVING NATIVE CORONARY ARTERY OF NATIVE HEART WITHOUT ANGINA PECTORIS: ICD-10-CM

## 2023-03-01 DIAGNOSIS — E78.00 PURE HYPERCHOLESTEROLEMIA: ICD-10-CM

## 2023-03-01 DIAGNOSIS — I35.0 AORTIC STENOSIS, MILD: ICD-10-CM

## 2023-03-01 DIAGNOSIS — Z86.79 S/P ASCENDING AORTIC ANEURYSM REPAIR: ICD-10-CM

## 2023-03-01 PROCEDURE — 99214 OFFICE O/P EST MOD 30 MIN: CPT | Performed by: INTERNAL MEDICINE

## 2023-03-01 NOTE — PROGRESS NOTES
[facility] Cardiology Office Follow Up Note    Tad Yanes  [id number]  2023    Primary Care Provider: Marina Hsu DO    Chief Complaint: Routine follow-up    History of Present Illness:   Mr. Tad Yanes is a 77.o. male who presents to the Cardiology Clinic for routine follow-up.  The patient has a past medical history significant for hypertension, hyperlipidemia, and an ascending aortic aneurysm, status post repair in [place] in approximately . He had a coronary angiogram at that time which showed no significant coronary artery disease in a left dominant system.  He also has a history of mild aortic stenosis.  He presents to cardiology clinic today for routine follow-up.  Since his last appointment, the patient reports he has been well without any significant changes in his health.  He remains active, without exertional chest pain or anginal symptoms.  No significant exertional dyspnea.  He denies any history of palpitations.  No palpitations.  No presyncopal or syncopal events.  No specific complaints today.    Past Cardiac Testin. Last Coronary Angio:  2010              1.  No significant coronary artery disease              2.  Left dominant system  2. Prior Stress Testing:  GXT 2020              1.  Abnormal exercise treadmill stress test with 1 mm ST depression in the inferior leads beginning 4 minutes into stress, resolving 1 minute into rest.              2.  No anginal symptoms during peak stress.              3.  Normal chronotropic and BP response to exercise.              4.  Average exercise capacity reaching 7.0 METS.              5.  Moderate risk Trevino treadmill score.  3. Last Echo:  2020              1.  Normal left ventricular size and systolic function, LVEF 60-65%.              2.  Mild concentric LVH.              3.  Normal LV diastolic filling pattern.              4.  Normal right ventricular size and systolic  function.              5.  Moderate calcification of the aortic valve with aortic sclerosis (mean gradient 15 mmHg, max velocity 238 cm/s)              6.  Trace MR and TR.              7.  Mild dilation of the proximal ascending aorta measuring 3.9 cm.  4. Prior Holter Monitor: None    Review of Systems:   Review of Systems   Constitutional: Negative for activity change, appetite change, chills, diaphoresis, fatigue, fever, unexpected weight gain and unexpected weight loss.   Eyes: Negative for blurred vision and double vision.   Respiratory: Negative for cough, chest tightness, shortness of breath and wheezing.    Cardiovascular: Negative for chest pain, palpitations and leg swelling.   Gastrointestinal: Negative for abdominal pain, anal bleeding, blood in stool and GERD.   Endocrine: Negative for cold intolerance and heat intolerance.   Genitourinary: Negative for hematuria.   Neurological: Negative for dizziness, syncope, weakness and light-headedness.   Hematological: Does not bruise/bleed easily.   Psychiatric/Behavioral: Negative for depressed mood and stress. The patient is not nervous/anxious.        I have reviewed and/or updated the patient's past medical, past surgical, family, social history, problem list and allergies as appropriate.     Medications:     Current Outpatient Medications:   •  aspirin 81 MG EC tablet, aspirin 81 mg tablet,delayed release  Take 1 tablet every day by oral route., Disp: , Rfl:   •  atenolol (TENORMIN) 25 MG tablet, atenolol 25 mg tablet  Take 0.5 tablets every day by oral route., Disp: , Rfl:   •  atorvastatin (LIPITOR) 40 MG tablet, Take 1 tablet by mouth Daily., Disp: 90 tablet, Rfl: 3  •  betamethasone, augmented, (DIPROLENE) 0.05 % ointment, betamethasone, augmented 0.05 % topical ointment  APPLY A THIN LAYER TOPICALLY TO AFFECTED AREAS ON HANDS NIGHTLY AND COVER WITH COTTON GLOVES AND LEAVE ON OVER NIGHT, Disp: , Rfl:   •  Coenzyme Q10 (COQ-10 PO), CoQ-10, Disp: , Rfl:  "  •  metFORMIN (GLUCOPHAGE) 500 MG tablet, Take 1 tablet by mouth 2 (Two) Times a Day With Meals., Disp: , Rfl:   •  Misc Natural Products (PROSTATE HEALTH PO), Prostate Health  one tablet daily, Disp: , Rfl:   •  Multiple Vitamins-Minerals (EYE VITAMINS & MINERALS PO), Eye Vitamin and Minerals  as directed, Disp: , Rfl:   •  nitroglycerin (NITROSTAT) 0.4 MG SL tablet, 1 under the tongue as needed for angina, may repeat q5mins for up three doses, Disp: 30 tablet, Rfl: 1  •  triamcinolone (KENALOG) 0.1 % cream, triamcinolone acetonide 0.1 % topical cream  APPLY A THIN LAYER TO THE AFFECTED AREA(S) BY TOPICAL ROUTE 2 TIMES PER DAY, Disp: , Rfl:   •  warfarin (COUMADIN) 4 MG tablet, Jantoven 4 mg tablet  TAKE 1 TABLET BY MOUTH ONE TIME A DAY, Disp: , Rfl:     Physical Exam:  Vital Signs:   Vitals:    03/01/23 1115   BP: 130/80   BP Location: Right arm   Patient Position: Sitting   Pulse: 70   Resp: 16   SpO2: 95%   Weight: 96.2 kg (212 lb)   Height: 177.8 cm (70\")       Physical Exam  Constitutional:       General: He is not in acute distress.     Appearance: Normal appearance. He is not diaphoretic.   HENT:      Head: Normocephalic and atraumatic.   Cardiovascular:      Rate and Rhythm: Normal rate and regular rhythm.      Comments: 2/6 systolic murmur over the aortic area  Pulmonary:      Effort: Pulmonary effort is normal. No respiratory distress.      Breath sounds: Normal breath sounds. No stridor. No wheezing, rhonchi or rales.   Abdominal:      General: Bowel sounds are normal. There is no distension.      Palpations: Abdomen is soft.      Tenderness: There is no abdominal tenderness. There is no guarding or rebound.   Musculoskeletal:         General: No swelling. Normal range of motion.      Cervical back: Neck supple. No tenderness.   Skin:     General: Skin is warm and dry.   Neurological:      General: No focal deficit present.      Mental Status: He is alert and oriented to person, place, and time. "   Psychiatric:         Mood and Affect: Mood normal.         Behavior: Behavior normal.         Results Review:   I reviewed the patient's new clinical results.      Assessment / Plan:     1.  Coronary artery disease  --No significant past cardiac history  --Remote coronary angiogram in approximately 2010 without significant CAD  --Mildly abnormal GXT in 11/2020, however no anginal symptoms and further invasive ischemic evaluation deferred at that time  --Remains active without chest pain or exertional anginal symptoms  -- Continue aspirin and high intensity statin  --Follow-up in 1 year, sooner if required     2.  Aortic stenosis  -- Remains asymptomatic  --Repeat echocardiogram today shows aortic stenosis is mild  --Repeat echocardiogram in 1 year for routine surveillance    3.  Essential hypertension  -- BP currently well controlled     4.  Hyperlipidemia  --On statin  --No recent lipid profile for review, will obtain labs from PCP     5.  Ascending aortic aneurysm repair  --S/p operative repair in 2010     6.  History of DVT/PE  --History of recurrent lower extremity DVTs, complicated by PE  --Underlying etiology unclear, suspicion for underlying hypercoagulable state  --Continue Coumadin      Follow Up:   Return in about 1 year (around 3/1/2024).      Thank you for allowing me to participate in the care of your patient. Please to not hesitate to contact me with additional questions or concerns.     ROSE MARIE Agustin MD  Interventional Cardiology   03/01/2023  11:22 EST

## 2024-01-29 ENCOUNTER — OFFICE VISIT (OUTPATIENT)
Dept: CARDIOLOGY | Facility: CLINIC | Age: 79
End: 2024-01-29
Payer: MEDICARE

## 2024-01-29 VITALS
SYSTOLIC BLOOD PRESSURE: 152 MMHG | RESPIRATION RATE: 18 BRPM | HEIGHT: 70 IN | OXYGEN SATURATION: 96 % | HEART RATE: 76 BPM | DIASTOLIC BLOOD PRESSURE: 90 MMHG | WEIGHT: 232 LBS | BODY MASS INDEX: 33.21 KG/M2

## 2024-01-29 DIAGNOSIS — Z86.79 S/P ASCENDING AORTIC ANEURYSM REPAIR: ICD-10-CM

## 2024-01-29 DIAGNOSIS — E78.00 PURE HYPERCHOLESTEROLEMIA: ICD-10-CM

## 2024-01-29 DIAGNOSIS — I10 PRIMARY HYPERTENSION: ICD-10-CM

## 2024-01-29 DIAGNOSIS — I25.10 CORONARY ARTERY DISEASE INVOLVING NATIVE CORONARY ARTERY OF NATIVE HEART WITHOUT ANGINA PECTORIS: Primary | ICD-10-CM

## 2024-01-29 DIAGNOSIS — Z98.890 S/P ASCENDING AORTIC ANEURYSM REPAIR: ICD-10-CM

## 2024-01-29 DIAGNOSIS — I35.8 NON-RHEUMATIC AORTIC SCLEROSIS: ICD-10-CM

## 2024-01-29 PROCEDURE — 99214 OFFICE O/P EST MOD 30 MIN: CPT | Performed by: INTERNAL MEDICINE

## 2024-01-29 PROCEDURE — 3077F SYST BP >= 140 MM HG: CPT | Performed by: INTERNAL MEDICINE

## 2024-01-29 PROCEDURE — 3080F DIAST BP >= 90 MM HG: CPT | Performed by: INTERNAL MEDICINE

## 2024-01-29 RX ORDER — ATENOLOL 25 MG/1
25 TABLET ORAL DAILY
Qty: 90 TABLET | Refills: 3 | Status: SHIPPED | OUTPATIENT
Start: 2024-01-29

## 2024-01-29 NOTE — PROGRESS NOTES
[facility] Cardiology Office Follow Up Note    Tad Yanes  [id number]  2024    Primary Care Provider: Marina Hsu DO    Chief Complaint: Routine follow-up    History of Present Illness:   Mr. Tad Yanes is a 78.o. male who presents to the Cardiology Clinic for routine follow-up.  The patient has a past medical history significant for hypertension, hyperlipidemia, and an ascending aortic aneurysm, status post repair in [place] in approximately . He had a coronary angiogram at that time which showed no significant coronary artery disease in a left dominant system.  He also has a history of mild aortic stenosis.  Today, the patient reports he has generally been doing well from a cardiac standpoint.  He has not had any significant chest pain or chest discomfort.  He does report an isolated episode of left neck discomfort, which resolved spontaneously.  No association with exertion.  He denies significant exertional shortness of breath.  No new complaints today.    Past Cardiac Testin. Last Coronary Angio:  2010              1.  No significant coronary artery disease              2.  Left dominant system  2. Prior Stress Testing:  GXT 2020              1.  Abnormal exercise treadmill stress test with 1 mm ST depression in the inferior leads beginning 4 minutes into stress, resolving 1 minute into rest.              2.  No anginal symptoms during peak stress.              3.  Normal chronotropic and BP response to exercise.              4.  Average exercise capacity reaching 7.0 METS.              5.  Moderate risk Trevino treadmill score.  3. Last Echo:  2020              1.  Normal left ventricular size and systolic function, LVEF 60-65%.              2.  Mild concentric LVH.              3.  Normal LV diastolic filling pattern.              4.  Normal right ventricular size and systolic function.              5.  Moderate calcification of the  aortic valve with aortic sclerosis (mean gradient 15 mmHg, max velocity 238 cm/s)              6.  Trace MR and TR.              7.  Mild dilation of the proximal ascending aorta measuring 3.9 cm.  4. Prior Holter Monitor: None    Review of Systems:   Review of Systems   Constitutional:  Negative for activity change, appetite change, chills, diaphoresis, fatigue, fever, unexpected weight gain and unexpected weight loss.   Eyes:  Negative for blurred vision and double vision.   Respiratory:  Negative for cough, chest tightness, shortness of breath and wheezing.    Cardiovascular:  Negative for chest pain, palpitations and leg swelling.   Gastrointestinal:  Negative for abdominal pain, anal bleeding, blood in stool and GERD.   Endocrine: Negative for cold intolerance and heat intolerance.   Genitourinary:  Negative for hematuria.   Musculoskeletal:         Left neck discomfort   Neurological:  Negative for dizziness, syncope, weakness and light-headedness.   Hematological:  Does not bruise/bleed easily.   Psychiatric/Behavioral:  Negative for depressed mood and stress. The patient is not nervous/anxious.        I have reviewed and/or updated the patient's past medical, past surgical, family, social history, problem list and allergies as appropriate.     Medications:     Current Outpatient Medications:     aspirin 81 MG EC tablet, aspirin 81 mg tablet,delayed release  Take 1 tablet every day by oral route., Disp: , Rfl:     atenolol (TENORMIN) 25 MG tablet, Take 1 tablet by mouth Daily., Disp: 90 tablet, Rfl: 3    atorvastatin (LIPITOR) 40 MG tablet, Take 1 tablet by mouth Daily., Disp: 90 tablet, Rfl: 3    Coenzyme Q10 (COQ-10 PO), Take  by mouth Daily., Disp: , Rfl:     metFORMIN (GLUCOPHAGE) 500 MG tablet, Take 1 tablet by mouth 2 (Two) Times a Day With Meals., Disp: , Rfl:     Misc Natural Products (PROSTATE HEALTH PO), Prostate Health  one tablet daily, Disp: , Rfl:     Multiple Vitamins-Minerals (EYE VITAMINS &  "MINERALS PO), Eye Vitamin and Minerals  as directed, Disp: , Rfl:     nitroglycerin (NITROSTAT) 0.4 MG SL tablet, 1 under the tongue as needed for angina, may repeat q5mins for up three doses, Disp: 30 tablet, Rfl: 1    triamcinolone (KENALOG) 0.1 % cream, triamcinolone acetonide 0.1 % topical cream  APPLY A THIN LAYER TO THE AFFECTED AREA(S) BY TOPICAL ROUTE 2 TIMES PER DAY, Disp: , Rfl:     warfarin (COUMADIN) 4 MG tablet, 4 mg 2 days 3 mg 5 days, Disp: , Rfl:     betamethasone, augmented, (DIPROLENE) 0.05 % ointment, betamethasone, augmented 0.05 % topical ointment  APPLY A THIN LAYER TOPICALLY TO AFFECTED AREAS ON HANDS NIGHTLY AND COVER WITH COTTON GLOVES AND LEAVE ON OVER NIGHT (Patient not taking: Reported on 1/29/2024), Disp: , Rfl:     Physical Exam:  Vital Signs:   Vitals:    01/29/24 1343   BP: 152/90   BP Location: Left arm   Patient Position: Sitting   Pulse: 76   Resp: 18   SpO2: 96%   Weight: 105 kg (232 lb)   Height: 177.8 cm (70\")       Physical Exam  Constitutional:       General: He is not in acute distress.     Appearance: Normal appearance. He is not diaphoretic.   HENT:      Head: Normocephalic and atraumatic.   Cardiovascular:      Rate and Rhythm: Normal rate and regular rhythm.      Heart sounds: No murmur heard.  Pulmonary:      Effort: Pulmonary effort is normal. No respiratory distress.      Breath sounds: Normal breath sounds. No stridor. No wheezing, rhonchi or rales.   Abdominal:      General: Bowel sounds are normal. There is no distension.      Palpations: Abdomen is soft.      Tenderness: There is no abdominal tenderness. There is no guarding or rebound.   Musculoskeletal:         General: No swelling. Normal range of motion.      Cervical back: Neck supple. No tenderness.   Skin:     General: Skin is warm and dry.   Neurological:      General: No focal deficit present.      Mental Status: He is alert and oriented to person, place, and time.   Psychiatric:         Mood and Affect: " Mood normal.         Behavior: Behavior normal.         Results Review:   I reviewed the patient's new clinical results.        Assessment / Plan:     1.  Coronary artery disease  --Remote coronary angiogram in approximately 2010 without significant CAD  --Mildly abnormal GXT in 11/2020, however no anginal symptoms and further invasive ischemic evaluation deferred at that time  -- No recent exertional angina, however the patient did have an isolated episode of left neck discomfort of unclear etiology  --Will proceed with pharmacologic MPS to further assess for ischemic etiology  -- Continue aspirin and high intensity statin  --Follow-up in 1 year, sooner if required     2.  Aortic stenosis  -- Remains asymptomatic  -- Echocardiogram today shows aortic stenosis remains mild  --Surveillance echocardiogram upon follow-up in 1 year     3.  Essential hypertension  -- Hypertensive today, however BP well-controlled on recent checks and on occasional home checks     4.  Hyperlipidemia  --On statin  --Will request labs from PCP     5.  Ascending aortic aneurysm repair  --S/p operative repair in 2010     6.  History of DVT/PE  --History of recurrent lower extremity DVTs, complicated by PE  --Underlying etiology unclear, suspicion for underlying hypercoagulable state  -- On Coumadin for prophylaxis    Follow Up:   Return in about 1 year (around 1/29/2025).      Thank you for allowing me to participate in the care of your patient. Please to not hesitate to contact me with additional questions or concerns.     ROSE MARIE Agustin MD  Interventional Cardiology   01/29/2024  13:42 EST

## 2024-03-11 ENCOUNTER — TELEPHONE (OUTPATIENT)
Dept: CARDIOLOGY | Facility: CLINIC | Age: 79
End: 2024-03-11
Payer: MEDICARE

## 2024-03-11 NOTE — TELEPHONE ENCOUNTER
Hub staff attempted to follow warm transfer process and was unsuccessful     Caller: Tad Yanes    Relationship to patient: Self    Best call back number: [phone]    Patient is needing: PATIENT DID NOT  SEE BACKSIDE OF PAPER WITH INSTRUCTION ON IT TO STOP TAKING COUMADIN AND TOOK THE MEDICATION LAST NIGHT AND NEEDS TO RESCHEDULE APPOINTMENT. PLEASE REACH OUT TO PATIENT. APPOINTMENT IS ON 3-12-24. PATIENT WANTS TO TALK TO SOMEONE ABOUT THE PROCEDURE.

## 2024-03-11 NOTE — TELEPHONE ENCOUNTER
Spoke with Pt and advised him the page he read was only for heart caths and he is doing a stress test tomorrow. Pt states verbal understanding

## 2024-03-12 ENCOUNTER — HOSPITAL ENCOUNTER (OUTPATIENT)
Dept: NUCLEAR MEDICINE | Facility: HOSPITAL | Age: 79
Discharge: HOME | End: 2024-03-12
Payer: MEDICARE

## 2024-03-12 DIAGNOSIS — I25.10 CORONARY ARTERY DISEASE INVOLVING NATIVE CORONARY ARTERY OF NATIVE HEART WITHOUT ANGINA PECTORIS: ICD-10-CM

## 2024-03-12 PROCEDURE — 0 TECHNETIUM SESTAMIBI: Performed by: INTERNAL MEDICINE

## 2024-03-12 PROCEDURE — 78452 HT MUSCLE IMAGE SPECT MULT: CPT

## 2024-03-12 PROCEDURE — 93017 CV STRESS TEST TRACING ONLY: CPT

## 2024-03-12 PROCEDURE — A9500 TC99M SESTAMIBI: HCPCS | Performed by: INTERNAL MEDICINE

## 2024-03-12 PROCEDURE — 25010000002 REGADENOSON 0.4 MG/5ML SOLUTION: Performed by: INTERNAL MEDICINE

## 2024-03-12 RX ORDER — REGADENOSON 0.08 MG/ML
0.4 INJECTION, SOLUTION INTRAVENOUS
Status: COMPLETED | OUTPATIENT
Start: 2024-03-12 | End: 2024-03-12

## 2024-03-12 RX ADMIN — REGADENOSON 0.4 MG: 0.08 INJECTION, SOLUTION INTRAVENOUS at 08:22

## 2024-03-12 RX ADMIN — TECHNETIUM TC 99M SESTAMIBI 1 DOSE: 1 INJECTION INTRAVENOUS at 08:22

## 2024-03-12 RX ADMIN — TECHNETIUM TC 99M SESTAMIBI 1 DOSE: 1 INJECTION INTRAVENOUS at 06:20

## 2024-03-18 LAB
BH CV REST NUCLEAR ISOTOPE DOSE: 9.9 MCI
BH CV STRESS COMMENTS STAGE 1: NORMAL
BH CV STRESS DOSE REGADENOSON STAGE 1: 0.4
BH CV STRESS DURATION MIN STAGE 1: 0
BH CV STRESS DURATION SEC STAGE 1: 10
BH CV STRESS NUCLEAR ISOTOPE DOSE: 33.6 MCI
BH CV STRESS PROTOCOL 1: NORMAL
BH CV STRESS RECOVERY BP: NORMAL MMHG
BH CV STRESS RECOVERY HR: 86 BPM
BH CV STRESS STAGE 1: 1
LV EF NUC BP: 58 %
MAXIMAL PREDICTED HEART RATE: 144 BPM
PERCENT MAX PREDICTED HR: 61.81 %
STRESS BASELINE BP: NORMAL MMHG
STRESS BASELINE HR: 62 BPM
STRESS PERCENT HR: 73 %
STRESS POST PEAK BP: NORMAL MMHG
STRESS POST PEAK HR: 89 BPM
STRESS TARGET HR: 122 BPM

## 2024-12-17 ENCOUNTER — OFFICE VISIT (OUTPATIENT)
Dept: NEUROLOGY | Facility: CLINIC | Age: 79
End: 2024-12-17
Payer: MEDICARE

## 2024-12-17 VITALS
HEIGHT: 70 IN | BODY MASS INDEX: 32.75 KG/M2 | OXYGEN SATURATION: 97 % | RESPIRATION RATE: 14 BRPM | SYSTOLIC BLOOD PRESSURE: 150 MMHG | HEART RATE: 63 BPM | TEMPERATURE: 98 F | WEIGHT: 228.8 LBS | DIASTOLIC BLOOD PRESSURE: 72 MMHG

## 2024-12-17 DIAGNOSIS — R41.89 SUBJECTIVE MEMORY COMPLAINTS: Primary | ICD-10-CM

## 2024-12-17 DIAGNOSIS — R26.9 GAIT DIFFICULTY: ICD-10-CM

## 2024-12-17 RX ORDER — ASCORBIC ACID 500 MG
500 TABLET ORAL DAILY
COMMUNITY

## 2024-12-17 RX ORDER — THIAMINE HCL 50 MG
50 TABLET ORAL DAILY
COMMUNITY
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
## 2024-12-24 ENCOUNTER — PATIENT ROUNDING (BHMG ONLY) (OUTPATIENT)
Dept: NEUROLOGY | Facility: CLINIC | Age: 79
End: 2024-12-24
Payer: MEDICARE

## 2025-01-07 ENCOUNTER — TREATMENT (OUTPATIENT)
Dept: PHYSICAL THERAPY | Facility: CLINIC | Age: 80
End: 2025-01-07
Payer: MEDICARE

## 2025-01-07 DIAGNOSIS — R26.9 GAIT DISTURBANCE: Primary | ICD-10-CM

## 2025-01-07 PROCEDURE — 97161 PT EVAL LOW COMPLEX 20 MIN: CPT

## 2025-01-07 PROCEDURE — 97535 SELF CARE MNGMENT TRAINING: CPT

## 2025-01-07 PROCEDURE — 97110 THERAPEUTIC EXERCISES: CPT

## 2025-01-07 NOTE — PROGRESS NOTES
Physical Therapy Initial Evaluation and Plan of Care  [address] [place] [address]      Patient: Tad Yanes   : 1945  Diagnosis/ICD-10 Code:  Gait disturbance [R26.9]  Referring practitioner: Renuka Smalls*  Date of Initial Visit: 2025  Today's Date: 2025  Patient seen for 1 session         Visit Diagnoses:    ICD-10-CM ICD-9-CM   1. Gait disturbance  R26.9 781.2         Subjective Questionnaire: LEFS: 59/80      Subjective Evaluation    History of Present Illness  Mechanism of injury: Pt reports he is not having any issues but is trying to be proactive as he notices slight path deviations sometimes when he is walking. States he wants to know how to fall properly Reports he has not had any falls. States he can be slightly off balance when he stands and turns. Notes no dizziness. Walks or uses a stationary bike most days of the week. Plays ball with his grandson.      Patient Occupation: retired Quality of life: good    Pain  No pain reported    Social Support  Lives in: one-story house  Lives with: spouse    Patient Goals  Patient goals for therapy: improved balance         Educated pt regarding exercises and balance activity advancement. Discussed the importance of performing regular activities against resistance to aid with strength improvements. Also discussed how to get up off of the floor following a fall    Objective          Strength/Myotome Testing     Left Hip   Planes of Motion   Flexion: 4  Abduction: 4    Right Hip   Planes of Motion   Flexion: 4  Abduction: 4    Left Knee   Flexion: 4+  Extension: 5    Right Knee   Flexion: 5  Extension: 5    Left Ankle/Foot   Dorsiflexion: 5 (small range)    Right Ankle/Foot   Dorsiflexion: 5 (small range)    Ambulation     Observational Gait   Decreased stride length.     Additional Observational Gait Details  Increased trunk flexion     Quality of Movement During Gait     Pelvis    Pelvis (Left): Positive  Trendelenburg.   Pelvis (Right): Positive Trendelenburg.     Functional Assessment     Comments  Pt unable to stand tandem without LOB, able to stand 3/4 tandem with eyes open without LOB, unable to stand with EC in a narrow TERRY  Pt able to perform 5 sit to stands in 11 sec             Assessment & Plan       Assessment  Impairments: abnormal gait   Other impairment: decreased balance  Functional limitations: walking   Assessment details: Pt is a 78 YO M who presents to the clinic due to minor gait and balance deficits. Pt will benefit from skilled PT for improving B LE strength and balance to aid with safe ambulation and daily activities.   Barriers to therapy: age  Prognosis: good    Goals  Plan Goals: No goals set at this time    Plan  Therapy options: will be seen for skilled therapy services  Planned therapy interventions: strengthening, stretching, gait training and neuromuscular re-education  Treatment plan discussed with: patient  Plan details: Pt feels he will be able to use the HEP to aid with improvements. Hold chart for 30 days to allow assessment of efficacy of HEP.  If pt does not call for an appt in 30 days DC chart at that time. If pt calls for an appt goals will need to be set.        History # of Personal Factors and/or Comorbidities: MODERATE (1-2)  Examination of Body System(s): # of elements: LOW (1-2)  Clinical Presentation: STABLE   Clinical Decision Making: LOW       Timed:         Manual Therapy:         mins  65173;     Therapeutic Exercise:   14      mins  24351;     Neuromuscular Alfonzo:       mins  98456;    Therapeutic Activity:          mins  40805;     Gait Training:          mins  85945;     Ultrasound:          mins  29938;    Ionto                                   mins   39191  Self Care                      10      mins   77884  Canalith Repos         mins 15927      Un-Timed:  Electrical Stimulation:         mins  33297 ( );  Dry Needling          mins self-pay  Traction           mins 24287  Low Eval   25      Mins  74333  Mod Eval          Mins  54716  High Eval                            Mins  89135        Timed Treatment:   24   mins   Total Treatment:     49   mins      PT: Katya Bauer PT     License Number: [id number]    Electronically signed by Katya Bauer PT, 01/07/25, 12:32 PM EST    Certification Period: 1/7/2025 thru 4/6/2025  I certify that the therapy services are furnished while this patient is under my care.  The services outlined above are required by this patient, and will be reviewed every 90 days.         Physician Signature:__________________________________________________    PHYSICIAN: Renuka Leahy APRN  NPI: [id number]      DATE:     Please sign and return via fax to [email or web address] . Thank you, [facility].   No

## 2025-01-27 ENCOUNTER — OFFICE VISIT (OUTPATIENT)
Dept: CARDIOLOGY | Facility: CLINIC | Age: 80
End: 2025-01-27
Payer: MEDICARE

## 2025-01-27 VITALS
WEIGHT: 226 LBS | HEART RATE: 72 BPM | DIASTOLIC BLOOD PRESSURE: 78 MMHG | SYSTOLIC BLOOD PRESSURE: 146 MMHG | BODY MASS INDEX: 32.35 KG/M2 | HEIGHT: 70 IN | OXYGEN SATURATION: 97 %

## 2025-01-27 DIAGNOSIS — I10 PRIMARY HYPERTENSION: ICD-10-CM

## 2025-01-27 DIAGNOSIS — I25.10 CORONARY ARTERY DISEASE INVOLVING NATIVE CORONARY ARTERY OF NATIVE HEART WITHOUT ANGINA PECTORIS: ICD-10-CM

## 2025-01-27 DIAGNOSIS — E78.00 PURE HYPERCHOLESTEROLEMIA: ICD-10-CM

## 2025-01-27 DIAGNOSIS — Z86.79 S/P ASCENDING AORTIC ANEURYSM REPAIR: ICD-10-CM

## 2025-01-27 DIAGNOSIS — Z98.890 S/P ASCENDING AORTIC ANEURYSM REPAIR: ICD-10-CM

## 2025-01-27 DIAGNOSIS — I35.8 NON-RHEUMATIC AORTIC SCLEROSIS: Primary | ICD-10-CM

## 2025-01-27 PROCEDURE — 3078F DIAST BP <80 MM HG: CPT | Performed by: INTERNAL MEDICINE

## 2025-01-27 PROCEDURE — 3077F SYST BP >= 140 MM HG: CPT | Performed by: INTERNAL MEDICINE

## 2025-01-27 PROCEDURE — 99214 OFFICE O/P EST MOD 30 MIN: CPT | Performed by: INTERNAL MEDICINE

## 2025-01-27 RX ORDER — ATENOLOL 25 MG/1
25 TABLET ORAL DAILY
Qty: 90 TABLET | Refills: 3 | Status: SHIPPED | OUTPATIENT
Start: 2025-01-27 | End: 2025-01-27 | Stop reason: SDUPTHER

## 2025-01-27 RX ORDER — ATENOLOL 25 MG/1
25 TABLET ORAL DAILY
Qty: 180 TABLET | Refills: 3 | Status: SHIPPED | OUTPATIENT
Start: 2025-01-27

## 2025-01-27 RX ORDER — LISINOPRIL 20 MG/1
20 TABLET ORAL DAILY
Qty: 90 TABLET | Refills: 3 | Status: SHIPPED | OUTPATIENT
Start: 2025-01-27

## 2025-01-27 RX ORDER — ACETAMINOPHEN 160 MG
1 TABLET,DISINTEGRATING ORAL DAILY
COMMUNITY
Start: 2024-11-17

## 2025-01-27 NOTE — PROGRESS NOTES
[facility] Office Follow Up Note    Tad Yanes  [id number]  2025    Primary Care Provider: Marina Hsu DO    Chief Complaint: Routine follow-up    History of Present Illness:   Mr. Tad Yanes is a 79.o. male who presents to the Cardiology Clinic for routine follow-up.  The patient has a past medical history significant for hypertension, hyperlipidemia, and an ascending aortic aneurysm, status post repair in [place] in approximately . He had a coronary angiogram at that time which showed no significant coronary artery disease in a left dominant system.  He also has a history of mild aortic stenosis.  Today, the patient ports he is doing well from a cardiac standpoint.  No significant chest pain or exertional chest discomfort.  He does have mild exertional dyspnea, without significant progression compared to his last follow-up.  No significant orthopnea or PND.  No reported lower extremity swelling.  No other specific complaints today.     Past Cardiac Testin. Last Coronary Angio:  2010              1.  No significant coronary artery disease              2.  Left dominant system  2. Prior Stress Testing:  GXT 2020              1.  Abnormal exercise treadmill stress test with 1 mm ST depression in the inferior leads beginning 4 minutes into stress, resolving 1 minute into rest.              2.  No anginal symptoms during peak stress.              3.  Normal chronotropic and BP response to exercise.              4.  Average exercise capacity reaching 7.0 METS.              5.  Moderate risk Trevino treadmill score.  3. Last Echo:  2020              1.  Normal left ventricular size and systolic function, LVEF 60-65%.              2.  Mild concentric LVH.              3.  Normal LV diastolic filling pattern.              4.  Normal right ventricular size and systolic function.              5.  Moderate calcification of the aortic valve with  aortic sclerosis (mean gradient 15 mmHg, max velocity 238 cm/s)              6.  Trace MR and TR.              7.  Mild dilation of the proximal ascending aorta measuring 3.9 cm.  4. Prior Holter Monitor: None    Review of Systems:   Review of Systems   Constitutional:  Negative for activity change, appetite change, chills, diaphoresis, fatigue, fever, unexpected weight gain and unexpected weight loss.   Eyes:  Negative for blurred vision and double vision.   Respiratory:  Positive for shortness of breath. Negative for cough, chest tightness and wheezing.    Cardiovascular:  Negative for chest pain, palpitations and leg swelling.   Gastrointestinal:  Negative for abdominal pain, anal bleeding, blood in stool and GERD.   Endocrine: Negative for cold intolerance and heat intolerance.   Genitourinary:  Negative for hematuria.   Neurological:  Negative for dizziness, syncope, weakness and light-headedness.   Hematological:  Does not bruise/bleed easily.   Psychiatric/Behavioral:  Negative for depressed mood and stress. The patient is not nervous/anxious.        I have reviewed and/or updated the patient's past medical, past surgical, family, social history, problem list and allergies as appropriate.     Medications:     Current Outpatient Medications:     ascorbic acid (VITAMIN C) 500 MG tablet, Take 1 tablet by mouth Daily., Disp: , Rfl:     aspirin 81 MG EC tablet, aspirin 81 mg tablet,delayed release  Take 1 tablet every day by oral route., Disp: , Rfl:     atenolol (TENORMIN) 25 MG tablet, Take 1 tablet by mouth Daily., Disp: 180 tablet, Rfl: 3    atorvastatin (LIPITOR) 40 MG tablet, Take 1 tablet by mouth Daily., Disp: 90 tablet, Rfl: 3    betamethasone, augmented, (DIPROLENE) 0.05 % ointment, , Disp: , Rfl:     Cholecalciferol (Vitamin D3) 50 MCG (2000 UT) capsule, Take 1 capsule by mouth Daily., Disp: , Rfl:     Coenzyme Q10 (COQ-10 PO), Take  by mouth Daily., Disp: , Rfl:     Cyanocobalamin (VITAMIN B 12 PO),  "Take  by mouth., Disp: , Rfl:     metFORMIN (GLUCOPHAGE) 500 MG tablet, Take 1 tablet by mouth 2 (Two) Times a Day With Meals., Disp: , Rfl:     Misc Natural Products (PROSTATE HEALTH PO), Prostate Health  one tablet daily, Disp: , Rfl:     Multiple Vitamins-Minerals (EYE VITAMINS & MINERALS PO), Eye Vitamin and Minerals  as directed, Disp: , Rfl:     nitroglycerin (NITROSTAT) 0.4 MG SL tablet, 1 under the tongue as needed for angina, may repeat q5mins for up three doses, Disp: 30 tablet, Rfl: 1    Thiamine HCl (vitamin B-1) 50 MG tablet, Take 1 tablet by mouth Daily., Disp: , Rfl:     triamcinolone (KENALOG) 0.1 % cream, triamcinolone acetonide 0.1 % topical cream  APPLY A THIN LAYER TO THE AFFECTED AREA(S) BY TOPICAL ROUTE 2 TIMES PER DAY, Disp: , Rfl:     warfarin (COUMADIN) 4 MG tablet, 4 mg 2 days 3 mg 5 days, Disp: , Rfl:     lisinopril (PRINIVIL,ZESTRIL) 20 MG tablet, Take 1 tablet by mouth Daily., Disp: 90 tablet, Rfl: 3    Physical Exam:  Vital Signs:   Vitals:    01/27/25 1039   BP: 146/78   BP Location: Left arm   Patient Position: Sitting   Cuff Size: Adult   Pulse: 72   SpO2: 97%   Weight: 103 kg (226 lb)   Height: 177.8 cm (70\")       Physical Exam  Constitutional:       General: He is not in acute distress.     Appearance: Normal appearance. He is not diaphoretic.   HENT:      Head: Normocephalic and atraumatic.   Cardiovascular:      Rate and Rhythm: Normal rate and regular rhythm.      Heart sounds: No murmur heard.  Pulmonary:      Effort: Pulmonary effort is normal. No respiratory distress.      Breath sounds: Normal breath sounds. No stridor. No wheezing, rhonchi or rales.   Abdominal:      General: Bowel sounds are normal. There is no distension.      Palpations: Abdomen is soft.      Tenderness: There is no abdominal tenderness. There is no guarding or rebound.   Musculoskeletal:         General: No swelling. Normal range of motion.      Cervical back: Neck supple. No tenderness.   Skin:     " General: Skin is warm and dry.   Neurological:      General: No focal deficit present.      Mental Status: He is alert and oriented to person, place, and time.   Psychiatric:         Mood and Affect: Mood normal.         Behavior: Behavior normal.         Results Review:   I reviewed the patient's new clinical results.        Assessment / Plan:     1.  Coronary artery disease  --Remote coronary angiogram in approximately 2010 without significant CAD  --Mildly abnormal GXT in 11/2020, however no anginal symptoms and further invasive ischemic evaluation deferred at that time  -- MPS in 2024 showed no evidence of inducible ischemia  -- Continue aspirin and high intensity statin  --Follow-up in 1 year, sooner if required     2.  Aortic stenosis  -- Remains asymptomatic  -- Repeat echocardiogram today shows aortic stenosis remains mild  -- Surveillance echocardiogram in 1-2 years     3.  Essential hypertension  -- Hypertensive today  -- Continue atenolol  --Start lisinopril, uptitrate as required for BP control     4.  Hyperlipidemia  -- Continue statin     5.  Ascending aortic aneurysm repair  --S/p operative repair in 2010     6.  History of DVT/PE  --History of recurrent lower extremity DVTs, complicated by PE  -- On Coumadin for prophylaxis    Follow Up:   Return in about 1 year (around 1/27/2026).      Thank you for allowing me to participate in the care of your patient. Please to not hesitate to contact me with additional questions or concerns.     ROSE MARIE Agustin MD  Interventional Cardiology   01/27/2025  10:42 EST

## 2025-03-03 ENCOUNTER — HOSPITAL ENCOUNTER (OUTPATIENT)
Dept: MRI IMAGING | Facility: HOSPITAL | Age: 80
Discharge: HOME | End: 2025-03-03
Admitting: NURSE PRACTITIONER
Payer: MEDICARE

## 2025-03-03 DIAGNOSIS — R41.89 SUBJECTIVE MEMORY COMPLAINTS: ICD-10-CM

## 2025-03-03 PROCEDURE — 25510000002 GADOBENATE DIMEGLUMINE 529 MG/ML SOLUTION: Performed by: NURSE PRACTITIONER

## 2025-03-03 PROCEDURE — 70553 MRI BRAIN STEM W/O & W/DYE: CPT

## 2025-03-03 PROCEDURE — A9577 INJ MULTIHANCE: HCPCS | Performed by: NURSE PRACTITIONER

## 2025-03-03 RX ADMIN — GADOBENATE DIMEGLUMINE 15 ML: 529 INJECTION, SOLUTION INTRAVENOUS at 13:56

## 2025-03-12 ENCOUNTER — OFFICE VISIT (OUTPATIENT)
Dept: NEUROLOGY | Facility: CLINIC | Age: 80
End: 2025-03-12
Payer: MEDICARE

## 2025-03-12 VITALS
WEIGHT: 221.2 LBS | DIASTOLIC BLOOD PRESSURE: 68 MMHG | HEART RATE: 60 BPM | BODY MASS INDEX: 31.67 KG/M2 | TEMPERATURE: 98.2 F | HEIGHT: 70 IN | SYSTOLIC BLOOD PRESSURE: 118 MMHG | OXYGEN SATURATION: 97 %

## 2025-03-12 DIAGNOSIS — R26.9 GAIT DIFFICULTY: ICD-10-CM

## 2025-03-12 DIAGNOSIS — R41.89 SUBJECTIVE MEMORY COMPLAINTS: Primary | ICD-10-CM

## 2025-03-12 DIAGNOSIS — I63.9 CEREBROVASCULAR ACCIDENT (CVA), UNSPECIFIED MECHANISM: ICD-10-CM

## 2025-03-12 RX ORDER — WARFARIN SODIUM 3 MG/1
TABLET ORAL
COMMUNITY
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
